# Patient Record
Sex: MALE | Race: WHITE | NOT HISPANIC OR LATINO | URBAN - METROPOLITAN AREA
[De-identification: names, ages, dates, MRNs, and addresses within clinical notes are randomized per-mention and may not be internally consistent; named-entity substitution may affect disease eponyms.]

---

## 2020-07-25 ENCOUNTER — TELEPHONE ENCOUNTER (OUTPATIENT)
Dept: URBAN - METROPOLITAN AREA CLINIC 13 | Facility: CLINIC | Age: 85
End: 2020-07-25

## 2020-07-26 ENCOUNTER — TELEPHONE ENCOUNTER (OUTPATIENT)
Dept: URBAN - METROPOLITAN AREA CLINIC 13 | Facility: CLINIC | Age: 85
End: 2020-07-26

## 2020-07-26 RX ORDER — MAGNESIUM 30 MG
TAKE AS DIRECTED TABLET ORAL
Refills: 0 | Status: ACTIVE | COMMUNITY

## 2020-07-26 RX ORDER — CHOLECALCIFEROL (VITAMIN D3)
USE AS DIRECTED CRYSTALS MISCELLANEOUS
Refills: 0 | Status: ACTIVE | COMMUNITY

## 2020-07-26 RX ORDER — APIXABAN 5 MG/1
TAKE 1 TABLET TWICE DAILY TABLET, FILM COATED ORAL
Refills: 0 | Status: ACTIVE | COMMUNITY

## 2020-07-26 RX ORDER — UMECLIDINIUM BROMIDE AND VILANTEROL TRIFENATATE 62.5; 25 UG/1; UG/1
INHALE 1 PUFFS DAILY POWDER RESPIRATORY (INHALATION)
Refills: 0 | Status: ACTIVE | COMMUNITY

## 2021-10-29 ENCOUNTER — OFFICE VISIT (OUTPATIENT)
Dept: URBAN - METROPOLITAN AREA CLINIC 113 | Facility: CLINIC | Age: 86
End: 2021-10-29
Payer: MEDICARE

## 2021-10-29 VITALS
DIASTOLIC BLOOD PRESSURE: 75 MMHG | HEIGHT: 69 IN | SYSTOLIC BLOOD PRESSURE: 123 MMHG | TEMPERATURE: 98.2 F | BODY MASS INDEX: 23.85 KG/M2 | WEIGHT: 161 LBS | HEART RATE: 80 BPM

## 2021-10-29 DIAGNOSIS — R14.0 ABDOMINAL BLOATING: ICD-10-CM

## 2021-10-29 DIAGNOSIS — K58.1 IRRITABLE BOWEL SYNDROME WITH CONSTIPATION: ICD-10-CM

## 2021-10-29 DIAGNOSIS — K56.609 SMALL BOWEL OBSTRUCTION: ICD-10-CM

## 2021-10-29 PROCEDURE — 99243 OFF/OP CNSLTJ NEW/EST LOW 30: CPT | Performed by: INTERNAL MEDICINE

## 2021-10-29 PROCEDURE — 99203 OFFICE O/P NEW LOW 30 MIN: CPT | Performed by: INTERNAL MEDICINE

## 2021-10-29 RX ORDER — MAGNESIUM HYDROXIDE 400 MG/5ML
5 ML AT LEAST 4 HOURS BETWEEN DOSES AS NEEDED SUSPENSION, ORAL (FINAL DOSE FORM) ORAL ONCE DAILY
Qty: 30 | Refills: 1 | OUTPATIENT
Start: 2021-10-29 | End: 2021-12-27

## 2021-10-29 RX ORDER — DOCUSATE SODIUM 100 MG/1
1 CAPSULE AS NEEDED CAPSULE ORAL ONCE A DAY
Status: ACTIVE | COMMUNITY

## 2021-10-29 RX ORDER — POLYETHYLENE GLYCOL 3350 17 G/17G
AS DIRECTED POWDER, FOR SOLUTION ORAL ONCE A DAY
Qty: 30 | Refills: 3 | OUTPATIENT
Start: 2021-10-29 | End: 2022-02-25

## 2021-10-29 RX ORDER — MULTIVITAMIN
1 TABLET TABLET ORAL ONCE A DAY
Status: ACTIVE | COMMUNITY

## 2021-10-29 RX ORDER — POTASSIUM CHLORIDE 750 MG/1
1 TABLET WITH FOOD TABLET, EXTENDED RELEASE ORAL TWICE A DAY
Status: ACTIVE | COMMUNITY

## 2021-10-29 RX ORDER — POLYETHYLENE GLYCOL 3350 17 G/17G
AS DIRECTED POWDER, FOR SOLUTION ORAL
Status: ACTIVE | COMMUNITY

## 2021-10-29 RX ORDER — FLUTICASONE FUROATE, UMECLIDINIUM BROMIDE AND VILANTEROL TRIFENATATE 100; 62.5; 25 UG/1; UG/1; UG/1
1 PUFF POWDER RESPIRATORY (INHALATION) ONCE A DAY
Status: ACTIVE | COMMUNITY

## 2021-10-29 RX ORDER — MAGNESIUM 30 MG
TAKE AS DIRECTED TABLET ORAL
Refills: 0 | Status: ACTIVE | COMMUNITY

## 2021-10-29 RX ORDER — POLYETHYLENE GLYCOL 3350 17 G/17G
AS DIRECTED POWDER, FOR SOLUTION ORAL
OUTPATIENT

## 2021-10-29 RX ORDER — CHOLECALCIFEROL (VITAMIN D3)
USE AS DIRECTED CRYSTALS MISCELLANEOUS
Refills: 0 | Status: ACTIVE | COMMUNITY

## 2021-10-29 NOTE — HPI-TODAY'S VISIT:
Mr. Montenegro is a 85-year-old male with a history of urothelial cancer s/p urostomy, chemoradiation, history of bowel obstructions s/p small bowel resections in 2017 referred from Dr. Bangura for evaluation of irritable bowel syndrome.  He has previously followed by Dr. Sanchez and last seen in the office in 2018.   His main complaints are constipation, incomplete evacuation and bloating.  He uses MIralax twice daily and colace once  daily.  He typically has a BM daily, but they are small and incomplete.  He has to strain to have a BM.  He was admitted to [facility] last month for another bowel obstruction that was treated nonoperatively with NG tube decompression.  he denies blood in the stool, nausea, vomiting or weight loss.

## 2022-01-03 ENCOUNTER — OFFICE VISIT (OUTPATIENT)
Dept: URBAN - METROPOLITAN AREA CLINIC 113 | Facility: CLINIC | Age: 87
End: 2022-01-03
Payer: MEDICARE

## 2022-01-03 VITALS
DIASTOLIC BLOOD PRESSURE: 75 MMHG | TEMPERATURE: 98.1 F | BODY MASS INDEX: 24.59 KG/M2 | HEIGHT: 69 IN | RESPIRATION RATE: 18 BRPM | SYSTOLIC BLOOD PRESSURE: 125 MMHG | HEART RATE: 77 BPM | WEIGHT: 166 LBS

## 2022-01-03 DIAGNOSIS — K59.09 CHRONIC CONSTIPATION: ICD-10-CM

## 2022-01-03 DIAGNOSIS — R14.0 ABDOMINAL BLOATING: ICD-10-CM

## 2022-01-03 DIAGNOSIS — K58.1 IRRITABLE BOWEL SYNDROME WITH CONSTIPATION: ICD-10-CM

## 2022-01-03 DIAGNOSIS — K56.609 SMALL BOWEL OBSTRUCTION: ICD-10-CM

## 2022-01-03 PROCEDURE — 99214 OFFICE O/P EST MOD 30 MIN: CPT | Performed by: INTERNAL MEDICINE

## 2022-01-03 RX ORDER — DOCUSATE SODIUM 100 MG/1
1 CAPSULE AS NEEDED CAPSULE ORAL ONCE A DAY
Status: ACTIVE | COMMUNITY

## 2022-01-03 RX ORDER — FLUTICASONE FUROATE, UMECLIDINIUM BROMIDE AND VILANTEROL TRIFENATATE 100; 62.5; 25 UG/1; UG/1; UG/1
1 PUFF POWDER RESPIRATORY (INHALATION) ONCE A DAY
Status: ACTIVE | COMMUNITY

## 2022-01-03 RX ORDER — MAGNESIUM 30 MG
TAKE AS DIRECTED TABLET ORAL
Refills: 0 | Status: ACTIVE | COMMUNITY

## 2022-01-03 RX ORDER — POLYETHYLENE GLYCOL 3350 17 G/17G
AS DIRECTED POWDER, FOR SOLUTION ORAL
Status: ACTIVE | COMMUNITY

## 2022-01-03 RX ORDER — POTASSIUM CHLORIDE 750 MG/1
1 TABLET WITH FOOD TABLET, EXTENDED RELEASE ORAL TWICE A DAY
Status: ACTIVE | COMMUNITY

## 2022-01-03 RX ORDER — CHOLECALCIFEROL (VITAMIN D3)
USE AS DIRECTED CRYSTALS MISCELLANEOUS
Refills: 0 | Status: ACTIVE | COMMUNITY

## 2022-01-03 RX ORDER — MULTIVITAMIN
1 TABLET TABLET ORAL ONCE A DAY
Status: ACTIVE | COMMUNITY

## 2022-01-03 RX ORDER — POLYETHYLENE GLYCOL 3350 17 G/17G
AS DIRECTED POWDER, FOR SOLUTION ORAL ONCE A DAY
Qty: 30 | Refills: 3 | Status: DISCONTINUED | COMMUNITY
Start: 2021-10-29 | End: 2022-02-25

## 2022-01-03 RX ORDER — POLYETHYLENE GLYCOL 3350 17 G/17G
AS DIRECTED POWDER, FOR SOLUTION ORAL ONCE A DAY
Qty: 30 | Refills: 3 | OUTPATIENT

## 2022-01-03 RX ORDER — MAGNESIUM HYDROXIDE 400 MG/5ML
5 ML AT LEAST 4 HOURS BETWEEN DOSES AS NEEDED SUSPENSION, ORAL (FINAL DOSE FORM) ORAL
OUTPATIENT
Start: 2022-01-03

## 2022-01-03 NOTE — HPI-TODAY'S VISIT:
Mr. Montenegro is a 86-year-old male with a history of urothelial cancer s/p urostomy, chemoradiation, history of multiple bowel obstructions s/p small bowel resections in 2017 here for follow up of recent hospitalization. He was hopsitalized at [facility] last weekend for PSBO, but only for 2 days.  He was having abdominal cramping and severe pain.  He was only having small bowel movements and was passing flatus.  They were not able to place an NG tube, but symtpoms resolved after 1 night.  He had a CT which showed a PSBO.  He was only using Miralax and stool softners daily, but stoppped MOM because it was causing his stools to be too loose.  He is tolerating low fiber diet and had a good BM yesterday.  No further abdominal pain.  He still struggles with constipation when using miralax only.   He otherwise denies nausea, vomiting, fevers, chills, blood in the stool, weight loss or change in bowel habits.  His last colonoscopy was in 2016 and was unremarkable.

## 2022-07-28 ENCOUNTER — OFFICE VISIT (OUTPATIENT)
Dept: URBAN - METROPOLITAN AREA CLINIC 113 | Facility: CLINIC | Age: 87
End: 2022-07-28
Payer: MEDICARE

## 2022-07-28 ENCOUNTER — DASHBOARD ENCOUNTERS (OUTPATIENT)
Age: 87
End: 2022-07-28

## 2022-07-28 VITALS
TEMPERATURE: 97.5 F | HEIGHT: 69 IN | BODY MASS INDEX: 23.4 KG/M2 | WEIGHT: 158 LBS | DIASTOLIC BLOOD PRESSURE: 62 MMHG | HEART RATE: 67 BPM | RESPIRATION RATE: 16 BRPM | SYSTOLIC BLOOD PRESSURE: 141 MMHG

## 2022-07-28 DIAGNOSIS — K56.609 SMALL BOWEL OBSTRUCTION: ICD-10-CM

## 2022-07-28 DIAGNOSIS — R14.0 ABDOMINAL BLOATING: ICD-10-CM

## 2022-07-28 DIAGNOSIS — K58.1 IRRITABLE BOWEL SYNDROME WITH CONSTIPATION: ICD-10-CM

## 2022-07-28 DIAGNOSIS — K59.09 CHRONIC CONSTIPATION: ICD-10-CM

## 2022-07-28 PROBLEM — 236069009: Status: ACTIVE | Noted: 2021-10-29

## 2022-07-28 PROBLEM — 116289008: Status: ACTIVE | Noted: 2021-10-29

## 2022-07-28 PROBLEM — 440630006: Status: ACTIVE | Noted: 2021-10-29

## 2022-07-28 PROBLEM — 281255004: Status: ACTIVE | Noted: 2021-10-29

## 2022-07-28 PROCEDURE — 99213 OFFICE O/P EST LOW 20 MIN: CPT | Performed by: INTERNAL MEDICINE

## 2022-07-28 RX ORDER — MAGNESIUM HYDROXIDE 400 MG/5ML
5 ML AT LEAST 4 HOURS BETWEEN DOSES AS NEEDED SUSPENSION, ORAL (FINAL DOSE FORM) ORAL
Status: ON HOLD | COMMUNITY
Start: 2022-01-03

## 2022-07-28 RX ORDER — APIXABAN 5 MG/1
AS DIRECTED TABLET, FILM COATED ORAL
Status: ACTIVE | COMMUNITY

## 2022-07-28 RX ORDER — POLYETHYLENE GLYCOL 3350 17 G/17G
AS DIRECTED POWDER, FOR SOLUTION ORAL ONCE A DAY
Qty: 30 | Refills: 3 | Status: ACTIVE | COMMUNITY

## 2022-07-28 RX ORDER — DOCUSATE SODIUM 100 MG/1
1 CAPSULE AS NEEDED CAPSULE ORAL ONCE A DAY
Qty: 30 | OUTPATIENT
Start: 2022-07-28 | End: 2022-08-27

## 2022-07-28 RX ORDER — DOCUSATE SODIUM 100 MG/1
1 CAPSULE AS NEEDED CAPSULE ORAL ONCE A DAY
Status: ACTIVE | COMMUNITY

## 2022-07-28 RX ORDER — OMEPRAZOLE 20 MG/1
1 CAPSULE 30 MINUTES BEFORE MORNING MEAL CAPSULE, DELAYED RELEASE ORAL ONCE A DAY
Status: ACTIVE | COMMUNITY

## 2022-07-28 RX ORDER — CHOLECALCIFEROL (VITAMIN D3)
USE AS DIRECTED CRYSTALS MISCELLANEOUS
Refills: 0 | Status: ACTIVE | COMMUNITY

## 2022-07-28 RX ORDER — POLYETHYLENE GLYCOL 3350 17 G/17G
AS DIRECTED POWDER, FOR SOLUTION ORAL
Status: ACTIVE | COMMUNITY

## 2022-07-28 RX ORDER — FLUTICASONE FUROATE, UMECLIDINIUM BROMIDE AND VILANTEROL TRIFENATATE 100; 62.5; 25 UG/1; UG/1; UG/1
1 PUFF POWDER RESPIRATORY (INHALATION) ONCE A DAY
Status: ACTIVE | COMMUNITY

## 2022-07-28 RX ORDER — METOPROLOL SUCCINATE 50 MG/1
1 TABLET TABLET, FILM COATED, EXTENDED RELEASE ORAL ONCE A DAY
Status: ACTIVE | COMMUNITY

## 2022-07-28 RX ORDER — MAGNESIUM 30 MG
TAKE AS DIRECTED TABLET ORAL
Refills: 0 | Status: ACTIVE | COMMUNITY

## 2022-07-28 RX ORDER — PREDNISOLONE 5 MG/1
1 TABLET IN THE MORNING WITH FOOD OR MILK TABLET ORAL ONCE A DAY
Status: ACTIVE | COMMUNITY

## 2022-07-28 RX ORDER — POLYETHYLENE GLYCOL 3350 17 G/17G
AS DIRECTED POWDER, FOR SOLUTION ORAL ONCE A DAY
Qty: 30 | Refills: 3 | OUTPATIENT

## 2022-07-28 RX ORDER — MULTIVITAMIN
1 TABLET TABLET ORAL ONCE A DAY
Status: ACTIVE | COMMUNITY

## 2022-07-28 RX ORDER — WHEAT DEXTRIN 3 G/3.5 G
1 TABLESPOON POWDER (GRAM) ORAL DAILY
Qty: 90 | Refills: 1 | OUTPATIENT
Start: 2022-07-28 | End: 2023-01-23

## 2022-07-28 RX ORDER — AMIODARONE HYDROCHLORIDE 200 MG/1
1 TABLET TABLET ORAL ONCE A DAY
Status: ACTIVE | COMMUNITY

## 2022-07-28 RX ORDER — POTASSIUM CHLORIDE 750 MG/1
1 TABLET WITH FOOD TABLET, EXTENDED RELEASE ORAL TWICE A DAY
Status: ACTIVE | COMMUNITY

## 2022-07-28 NOTE — HPI-TODAY'S VISIT:
Mr. Montenegro is a 86-year-old male with a history of urothelial cancer s/p urostomy, chemoradiation, history of multiple bowel obstructions s/p small bowel resections in 2017 here for follow up of constipation. He is on a bowel regimen of MiraLAX twice daily and Colace twice daily.  He stopped milk of magnesia because it caused diarrhea.  He is having daily bowel movements and rarely misses a day.  He was having some abdominal pain in the past, but that has resolved.  He still has a strain to have a BM and his stools are sometimes hard.  He also started eating prunes.  He does not drink that much water during the day.  There is no blood in the stool, nausea, vomiting or weight loss.  He does have atrial fibrillation and had ablation in June.  He is scheduled to have a watchman procedure with Dr. Mendoza.  He had another small bowel obstruction in May or June.  His last colonoscopy was in 2016 and was unremarkable.